# Patient Record
Sex: MALE | Race: WHITE | HISPANIC OR LATINO | URBAN - METROPOLITAN AREA
[De-identification: names, ages, dates, MRNs, and addresses within clinical notes are randomized per-mention and may not be internally consistent; named-entity substitution may affect disease eponyms.]

---

## 2017-07-28 ENCOUNTER — FOLLOW UP ESTABLISHED (OUTPATIENT)
Dept: URBAN - METROPOLITAN AREA CLINIC 60 | Facility: CLINIC | Age: 82
End: 2017-07-28
Payer: COMMERCIAL

## 2017-07-28 DIAGNOSIS — Z96.1 PRESENCE OF INTRAOCULAR LENS: ICD-10-CM

## 2017-07-28 DIAGNOSIS — H26.492 OTHER SECONDARY CATARACT, LEFT EYE: ICD-10-CM

## 2017-07-28 PROCEDURE — 92133 CPTRZD OPH DX IMG PST SGM ON: CPT | Performed by: OPHTHALMOLOGY

## 2017-07-28 PROCEDURE — 92014 COMPRE OPH EXAM EST PT 1/>: CPT | Performed by: OPHTHALMOLOGY

## 2017-07-28 RX ORDER — LATANOPROST 50 UG/ML
0.005 % SOLUTION OPHTHALMIC
Qty: 3 | Refills: 3 | Status: INACTIVE
Start: 2017-07-28 | End: 2018-08-17

## 2017-07-28 ASSESSMENT — INTRAOCULAR PRESSURE
OD: 14
OS: 14

## 2017-07-28 ASSESSMENT — VISUAL ACUITY
OD: 20/30
OS: 20/25

## 2017-10-30 ENCOUNTER — FOLLOW UP ESTABLISHED (OUTPATIENT)
Dept: URBAN - METROPOLITAN AREA CLINIC 60 | Facility: CLINIC | Age: 82
End: 2017-10-30
Payer: COMMERCIAL

## 2017-10-30 DIAGNOSIS — I63.9 CEREBRAL INFARCTION, UNSPECIFIED: ICD-10-CM

## 2017-10-30 PROCEDURE — 92012 INTRM OPH EXAM EST PATIENT: CPT | Performed by: OPHTHALMOLOGY

## 2017-10-30 PROCEDURE — 92083 EXTENDED VISUAL FIELD XM: CPT | Performed by: OPHTHALMOLOGY

## 2017-10-30 ASSESSMENT — INTRAOCULAR PRESSURE
OD: 14
OS: 14

## 2018-02-12 ENCOUNTER — FOLLOW UP ESTABLISHED (OUTPATIENT)
Dept: URBAN - METROPOLITAN AREA CLINIC 60 | Facility: CLINIC | Age: 83
End: 2018-02-12
Payer: COMMERCIAL

## 2018-02-12 DIAGNOSIS — H40.1133 PRIMARY OPEN-ANGLE GLAUCOMA, SEVERE STAGE, BILATERAL: Primary | ICD-10-CM

## 2018-02-12 DIAGNOSIS — Z86.73 OLD CVA: ICD-10-CM

## 2018-02-12 PROCEDURE — 76514 ECHO EXAM OF EYE THICKNESS: CPT | Performed by: OPHTHALMOLOGY

## 2018-02-12 PROCEDURE — 92250 FUNDUS PHOTOGRAPHY W/I&R: CPT | Performed by: OPHTHALMOLOGY

## 2018-02-12 PROCEDURE — 92012 INTRM OPH EXAM EST PATIENT: CPT | Performed by: OPHTHALMOLOGY

## 2018-02-12 ASSESSMENT — INTRAOCULAR PRESSURE
OD: 13
OS: 14

## 2018-08-17 ENCOUNTER — FOLLOW UP ESTABLISHED (OUTPATIENT)
Dept: URBAN - METROPOLITAN AREA CLINIC 60 | Facility: CLINIC | Age: 83
End: 2018-08-17
Payer: COMMERCIAL

## 2018-08-17 PROCEDURE — 99213 OFFICE O/P EST LOW 20 MIN: CPT | Performed by: OPHTHALMOLOGY

## 2018-08-17 RX ORDER — LATANOPROST 50 UG/ML
0.005 % SOLUTION OPHTHALMIC
Qty: 3 | Refills: 3 | Status: INACTIVE
Start: 2018-08-17 | End: 2018-10-15

## 2018-08-17 ASSESSMENT — INTRAOCULAR PRESSURE
OD: 10
OS: 11

## 2019-01-11 ENCOUNTER — FOLLOW UP ESTABLISHED (OUTPATIENT)
Dept: URBAN - METROPOLITAN AREA CLINIC 60 | Facility: CLINIC | Age: 84
End: 2019-01-11
Payer: COMMERCIAL

## 2019-01-11 DIAGNOSIS — H52.4 PRESBYOPIA: ICD-10-CM

## 2019-01-11 PROCEDURE — 92083 EXTENDED VISUAL FIELD XM: CPT | Performed by: OPHTHALMOLOGY

## 2019-01-11 PROCEDURE — 92014 COMPRE OPH EXAM EST PT 1/>: CPT | Performed by: OPHTHALMOLOGY

## 2019-01-11 PROCEDURE — 92250 FUNDUS PHOTOGRAPHY W/I&R: CPT | Performed by: OPHTHALMOLOGY

## 2019-01-11 ASSESSMENT — INTRAOCULAR PRESSURE
OD: 12
OS: 12

## 2019-01-11 ASSESSMENT — VISUAL ACUITY
OS: 20/40
OD: 20/40

## 2019-07-08 ENCOUNTER — FOLLOW UP ESTABLISHED (OUTPATIENT)
Dept: URBAN - METROPOLITAN AREA CLINIC 60 | Facility: CLINIC | Age: 84
End: 2019-07-08
Payer: COMMERCIAL

## 2019-07-08 PROCEDURE — 92133 CPTRZD OPH DX IMG PST SGM ON: CPT | Performed by: OPTOMETRIST

## 2019-07-08 PROCEDURE — 92002 INTRM OPH EXAM NEW PATIENT: CPT | Performed by: OPTOMETRIST

## 2019-07-08 PROCEDURE — 92012 INTRM OPH EXAM EST PATIENT: CPT | Performed by: OPTOMETRIST

## 2019-07-08 ASSESSMENT — INTRAOCULAR PRESSURE
OD: 13
OS: 13

## 2020-01-16 ENCOUNTER — FOLLOW UP ESTABLISHED (OUTPATIENT)
Dept: URBAN - METROPOLITAN AREA CLINIC 60 | Facility: CLINIC | Age: 85
End: 2020-01-16
Payer: COMMERCIAL

## 2020-01-16 DIAGNOSIS — H53.40 UNSPECIFIED VISUAL FIELD DEFECTS: ICD-10-CM

## 2020-01-16 PROCEDURE — 92133 CPTRZD OPH DX IMG PST SGM ON: CPT | Performed by: OPTOMETRIST

## 2020-01-16 PROCEDURE — 92014 COMPRE OPH EXAM EST PT 1/>: CPT | Performed by: OPTOMETRIST

## 2020-01-16 ASSESSMENT — INTRAOCULAR PRESSURE
OD: 11
OS: 12

## 2020-07-17 ENCOUNTER — FOLLOW UP ESTABLISHED (OUTPATIENT)
Dept: URBAN - METROPOLITAN AREA CLINIC 60 | Facility: CLINIC | Age: 85
End: 2020-07-17
Payer: COMMERCIAL

## 2020-07-17 PROCEDURE — 92012 INTRM OPH EXAM EST PATIENT: CPT | Performed by: OPTOMETRIST

## 2020-07-17 PROCEDURE — 92083 EXTENDED VISUAL FIELD XM: CPT | Performed by: OPTOMETRIST

## 2020-07-17 ASSESSMENT — INTRAOCULAR PRESSURE
OD: 12
OS: 12

## 2021-01-18 ENCOUNTER — FOLLOW UP ESTABLISHED (OUTPATIENT)
Dept: URBAN - METROPOLITAN AREA CLINIC 60 | Facility: CLINIC | Age: 86
End: 2021-01-18
Payer: COMMERCIAL

## 2021-01-18 DIAGNOSIS — Z79.84 LONG TERM (CURRENT) USE OF ORAL ANTIDIABETIC DRUGS: ICD-10-CM

## 2021-01-18 PROCEDURE — 92133 CPTRZD OPH DX IMG PST SGM ON: CPT | Performed by: OPTOMETRIST

## 2021-01-18 PROCEDURE — 92014 COMPRE OPH EXAM EST PT 1/>: CPT | Performed by: OPTOMETRIST

## 2021-01-18 ASSESSMENT — INTRAOCULAR PRESSURE
OS: 16
OD: 16

## 2021-09-02 ENCOUNTER — OFFICE VISIT (OUTPATIENT)
Dept: URBAN - METROPOLITAN AREA CLINIC 60 | Facility: CLINIC | Age: 86
End: 2021-09-02
Payer: COMMERCIAL

## 2021-09-02 PROCEDURE — 92083 EXTENDED VISUAL FIELD XM: CPT | Performed by: OPTOMETRIST

## 2021-09-02 PROCEDURE — 99213 OFFICE O/P EST LOW 20 MIN: CPT | Performed by: OPTOMETRIST

## 2021-09-02 ASSESSMENT — INTRAOCULAR PRESSURE
OD: 15
OS: 12

## 2021-09-02 NOTE — IMPRESSION/PLAN
Impression: Primary open-angle glaucoma, severe stage, right eye *Latanoprost QHS OD (unsure of start date/ before 2011?)* *D/C Timolol (pt not compliant)(07/2017)* *Started Timolol QAM (elevated IOP)(07/2011)* Plan: IOP is stable. Patient educated on findings. Reviewed today's visual field and last testing done. Patient to continue with Latanoprost QHS OD. Return in 6 months for a complete exam and OCT(RNFL).

## 2022-03-03 ENCOUNTER — OFFICE VISIT (OUTPATIENT)
Dept: URBAN - METROPOLITAN AREA CLINIC 60 | Facility: CLINIC | Age: 87
End: 2022-03-03
Payer: COMMERCIAL

## 2022-03-03 DIAGNOSIS — H26.493 OTHER SECONDARY CATARACT, BILATERAL: ICD-10-CM

## 2022-03-03 DIAGNOSIS — E11.9 TYPE 2 DIABETES MELLITUS W/O COMPLICATION: ICD-10-CM

## 2022-03-03 DIAGNOSIS — H40.1121 PRIMARY OPEN-ANGLE GLAUCOMA, LEFT EYE, MILD STAGE: ICD-10-CM

## 2022-03-03 DIAGNOSIS — H43.813 VITREOUS DEGENERATION, BILATERAL: ICD-10-CM

## 2022-03-03 DIAGNOSIS — H40.1113 PRIMARY OPEN-ANGLE GLAUCOMA, RIGHT EYE, SEVERE STAGE: Primary | ICD-10-CM

## 2022-03-03 PROCEDURE — 92133 CPTRZD OPH DX IMG PST SGM ON: CPT | Performed by: OPTOMETRIST

## 2022-03-03 PROCEDURE — 92014 COMPRE OPH EXAM EST PT 1/>: CPT | Performed by: OPTOMETRIST

## 2022-03-03 RX ORDER — LATANOPROST 50 UG/ML
0.005 % SOLUTION OPHTHALMIC
Qty: 7.5 | Refills: 3 | Status: ACTIVE
Start: 2022-03-03

## 2022-03-03 ASSESSMENT — VISUAL ACUITY
OS: 20/30
OD: 20/30

## 2022-03-03 ASSESSMENT — INTRAOCULAR PRESSURE
OS: 14
OD: 13

## 2022-03-03 NOTE — IMPRESSION/PLAN
Impression: Type 2 diabetes mellitus w/o complication: X70.8. Plan: No evidence of diabetic retinopathy or diabetic macular edema. Discussed ocular and systemic benefits of blood sugar control. Stressed importance of yearly diabetic eye exams.

## 2022-03-03 NOTE — IMPRESSION/PLAN
Impression: Other secondary cataract, bilateral: H26.493. Plan: Opacified capsule is not affecting vision. PCO is out of visual axis. Patient to monitor vision and return if decreased vision. Will re-evaluate in 1 year.

## 2022-03-03 NOTE — IMPRESSION/PLAN
Impression: Primary open-angle glaucoma, severe stage, right eye *Latanoprost QHS OD (unsure of start date/ before 2011?)* *D/C Timolol (pt not compliant)(07/2017)* *Started Timolol QAM (elevated IOP)(07/2011)* Plan: IOP is stable. Patient educated on findings. Reviewed today's OCT(RNFL) and last testing done. Patient to continue with Latanoprost QHS OD, erx'd refills to patient's pharmacy. Return in 6 months for a IOP check and visual field.

## 2022-09-12 ENCOUNTER — OFFICE VISIT (OUTPATIENT)
Dept: URBAN - METROPOLITAN AREA CLINIC 60 | Facility: CLINIC | Age: 87
End: 2022-09-12
Payer: COMMERCIAL

## 2022-09-12 DIAGNOSIS — H40.1121 PRIMARY OPEN-ANGLE GLAUCOMA, LEFT EYE, MILD STAGE: ICD-10-CM

## 2022-09-12 DIAGNOSIS — H40.1113 PRIMARY OPEN-ANGLE GLAUCOMA, RIGHT EYE, SEVERE STAGE: Primary | ICD-10-CM

## 2022-09-12 PROCEDURE — 99213 OFFICE O/P EST LOW 20 MIN: CPT | Performed by: OPTOMETRIST

## 2022-09-12 PROCEDURE — 92083 EXTENDED VISUAL FIELD XM: CPT | Performed by: OPTOMETRIST

## 2022-09-12 ASSESSMENT — INTRAOCULAR PRESSURE
OS: 14
OD: 14

## 2022-09-12 NOTE — IMPRESSION/PLAN
Impression: Primary open-angle glaucoma, severe stage, right eye *Latanoprost QHS OD (unsure of start date/ before 2011?)* *D/C Timolol (pt not compliant)(07/2017)* *Started Timolol QAM (elevated IOP)(07/2011)* Plan: IOP is stable. Patient educated on findings. Reviewed today's visual field and last testing done. Visual field is stable OU. Patient to continue with Latanoprost QHS OD. Return in 6 months for a complete exam and OCT(RNFL).

## 2023-09-18 ENCOUNTER — OFFICE VISIT (OUTPATIENT)
Dept: URBAN - METROPOLITAN AREA CLINIC 60 | Facility: CLINIC | Age: 88
End: 2023-09-18
Payer: COMMERCIAL

## 2023-09-18 DIAGNOSIS — H40.1121 PRIMARY OPEN-ANGLE GLAUCOMA, LEFT EYE, MILD STAGE: ICD-10-CM

## 2023-09-18 DIAGNOSIS — H40.1113 PRIMARY OPEN-ANGLE GLAUCOMA, RIGHT EYE, SEVERE STAGE: Primary | ICD-10-CM

## 2023-09-18 PROCEDURE — 92012 INTRM OPH EXAM EST PATIENT: CPT | Performed by: OPTOMETRIST

## 2023-09-18 PROCEDURE — 92083 EXTENDED VISUAL FIELD XM: CPT | Performed by: OPTOMETRIST

## 2023-09-18 ASSESSMENT — INTRAOCULAR PRESSURE
OS: 13
OD: 15

## 2024-03-19 ENCOUNTER — OFFICE VISIT (OUTPATIENT)
Dept: URBAN - METROPOLITAN AREA CLINIC 60 | Facility: CLINIC | Age: 89
End: 2024-03-19
Payer: COMMERCIAL

## 2024-03-19 DIAGNOSIS — H43.813 VITREOUS DEGENERATION, BILATERAL: ICD-10-CM

## 2024-03-19 DIAGNOSIS — H40.1113 PRIMARY OPEN-ANGLE GLAUCOMA, RIGHT EYE, SEVERE STAGE: ICD-10-CM

## 2024-03-19 DIAGNOSIS — E11.9 TYPE 2 DIABETES MELLITUS WITHOUT COMPLICATIONS: Primary | ICD-10-CM

## 2024-03-19 DIAGNOSIS — H40.1121 PRIMARY OPEN-ANGLE GLAUCOMA, LEFT EYE, MILD STAGE: ICD-10-CM

## 2024-03-19 DIAGNOSIS — H53.40 UNSPECIFIED VISUAL FIELD DEFECTS: ICD-10-CM

## 2024-03-19 DIAGNOSIS — H26.493 OTHER SECONDARY CATARACT, BILATERAL: ICD-10-CM

## 2024-03-19 PROCEDURE — 92014 COMPRE OPH EXAM EST PT 1/>: CPT | Performed by: OPTOMETRIST

## 2024-03-19 PROCEDURE — 92133 CPTRZD OPH DX IMG PST SGM ON: CPT | Performed by: OPTOMETRIST

## 2024-03-19 ASSESSMENT — INTRAOCULAR PRESSURE
OD: 13
OS: 13

## 2024-03-19 ASSESSMENT — VISUAL ACUITY
OS: 20/40
OD: 20/30

## 2024-04-08 ENCOUNTER — OFFICE VISIT (OUTPATIENT)
Dept: URBAN - METROPOLITAN AREA CLINIC 60 | Facility: CLINIC | Age: 89
End: 2024-04-08
Payer: COMMERCIAL

## 2024-04-08 DIAGNOSIS — H43.813 VITREOUS DEGENERATION, BILATERAL: ICD-10-CM

## 2024-04-08 DIAGNOSIS — H26.493 OTHER SECONDARY CATARACT, BILATERAL: Primary | ICD-10-CM

## 2024-04-08 DIAGNOSIS — E11.9 TYPE 2 DIABETES MELLITUS WITHOUT COMPLICATIONS: ICD-10-CM

## 2024-04-08 PROCEDURE — 99204 OFFICE O/P NEW MOD 45 MIN: CPT | Performed by: OPHTHALMOLOGY

## 2024-04-08 ASSESSMENT — VISUAL ACUITY
OD: 20/30
OS: 20/40

## 2024-04-08 ASSESSMENT — KERATOMETRY
OD: 43.86
OS: 44.08

## 2024-04-08 ASSESSMENT — INTRAOCULAR PRESSURE
OS: 11
OD: 12

## 2024-05-02 ENCOUNTER — LASER (OUTPATIENT)
Dept: URBAN - METROPOLITAN AREA SURGERY 37 | Facility: SURGERY | Age: 89
End: 2024-05-02
Payer: COMMERCIAL

## 2024-05-16 ENCOUNTER — POST-OPERATIVE VISIT (OUTPATIENT)
Dept: URBAN - METROPOLITAN AREA CLINIC 60 | Facility: CLINIC | Age: 89
End: 2024-05-16

## 2024-05-16 DIAGNOSIS — Z96.1 PRESENCE OF INTRAOCULAR LENS: Primary | ICD-10-CM

## 2024-05-16 PROCEDURE — 99024 POSTOP FOLLOW-UP VISIT: CPT | Performed by: OPTOMETRIST

## 2024-05-16 ASSESSMENT — INTRAOCULAR PRESSURE
OS: 14
OD: 11

## 2024-09-04 ENCOUNTER — OFFICE VISIT (OUTPATIENT)
Dept: URBAN - METROPOLITAN AREA CLINIC 60 | Facility: CLINIC | Age: 89
End: 2024-09-04
Payer: COMMERCIAL

## 2024-09-04 DIAGNOSIS — H40.1113 PRIMARY OPEN-ANGLE GLAUCOMA, RIGHT EYE, SEVERE STAGE: Primary | ICD-10-CM

## 2024-09-04 DIAGNOSIS — H40.1121 PRIMARY OPEN-ANGLE GLAUCOMA, LEFT EYE, MILD STAGE: ICD-10-CM

## 2024-09-04 PROCEDURE — 92012 INTRM OPH EXAM EST PATIENT: CPT | Performed by: OPTOMETRIST

## 2024-09-04 PROCEDURE — 92083 EXTENDED VISUAL FIELD XM: CPT | Performed by: OPTOMETRIST

## 2024-09-04 RX ORDER — TIMOLOL MALEATE 5 MG/ML
0.5 % SOLUTION/ DROPS OPHTHALMIC
Qty: 5 | Refills: 11 | Status: ACTIVE
Start: 2024-09-04

## 2024-09-04 ASSESSMENT — INTRAOCULAR PRESSURE
OS: 16
OD: 16

## 2025-02-27 ENCOUNTER — OFFICE VISIT (OUTPATIENT)
Dept: URBAN - METROPOLITAN AREA CLINIC 60 | Facility: CLINIC | Age: 89
End: 2025-02-27
Payer: COMMERCIAL

## 2025-02-27 DIAGNOSIS — E11.9 TYPE 2 DIABETES MELLITUS W/O COMPLICATION: Primary | ICD-10-CM

## 2025-02-27 DIAGNOSIS — H40.1113 PRIMARY OPEN-ANGLE GLAUCOMA, RIGHT EYE, SEVERE STAGE: ICD-10-CM

## 2025-02-27 DIAGNOSIS — H16.223 KERATOCONJUNCTIVITIS SICCA, BILATERAL: ICD-10-CM

## 2025-02-27 DIAGNOSIS — H43.813 VITREOUS DEGENERATION, BILATERAL: ICD-10-CM

## 2025-02-27 DIAGNOSIS — H40.1121 PRIMARY OPEN-ANGLE GLAUCOMA, LEFT EYE, MILD STAGE: ICD-10-CM

## 2025-02-27 DIAGNOSIS — Z96.1 PRESENCE OF INTRAOCULAR LENS: ICD-10-CM

## 2025-02-27 PROCEDURE — 92014 COMPRE OPH EXAM EST PT 1/>: CPT | Performed by: OPTOMETRIST

## 2025-02-27 PROCEDURE — 92133 CPTRZD OPH DX IMG PST SGM ON: CPT | Performed by: OPTOMETRIST

## 2025-02-27 ASSESSMENT — INTRAOCULAR PRESSURE
OS: 12
OD: 12

## 2025-08-06 ENCOUNTER — OFFICE VISIT (OUTPATIENT)
Dept: URBAN - METROPOLITAN AREA CLINIC 60 | Facility: CLINIC | Age: 89
End: 2025-08-06
Payer: COMMERCIAL

## 2025-08-06 DIAGNOSIS — H40.1121 PRIMARY OPEN-ANGLE GLAUCOMA, LEFT EYE, MILD STAGE: ICD-10-CM

## 2025-08-06 DIAGNOSIS — H40.1113 PRIMARY OPEN-ANGLE GLAUCOMA, RIGHT EYE, SEVERE STAGE: Primary | ICD-10-CM

## 2025-08-06 PROCEDURE — 92012 INTRM OPH EXAM EST PATIENT: CPT | Performed by: OPTOMETRIST

## 2025-08-06 PROCEDURE — 92083 EXTENDED VISUAL FIELD XM: CPT | Performed by: OPTOMETRIST

## 2025-08-06 ASSESSMENT — INTRAOCULAR PRESSURE
OS: 12
OD: 9